# Patient Record
Sex: FEMALE | Race: WHITE | Employment: FULL TIME | URBAN - NONMETROPOLITAN AREA
[De-identification: names, ages, dates, MRNs, and addresses within clinical notes are randomized per-mention and may not be internally consistent; named-entity substitution may affect disease eponyms.]

---

## 2017-10-14 ENCOUNTER — NURSE TRIAGE (OUTPATIENT)
Dept: ADMINISTRATIVE | Age: 27
End: 2017-10-14

## 2017-10-14 ENCOUNTER — HOSPITAL ENCOUNTER (EMERGENCY)
Age: 27
Discharge: HOME | End: 2017-10-14
Attending: EMERGENCY MEDICINE
Payer: MEDICARE

## 2017-10-14 VITALS
SYSTOLIC BLOOD PRESSURE: 152 MMHG | RESPIRATION RATE: 18 BRPM | TEMPERATURE: 98.3 F | HEART RATE: 98 BPM | OXYGEN SATURATION: 97 % | DIASTOLIC BLOOD PRESSURE: 95 MMHG

## 2017-10-14 DIAGNOSIS — I82.4Z2 ACUTE DEEP VEIN THROMBOSIS (DVT) OF DISTAL VEIN OF LEFT LOWER EXTREMITY (HCC): Primary | ICD-10-CM

## 2017-10-14 PROCEDURE — 99282 EMERGENCY DEPT VISIT SF MDM: CPT

## 2017-10-14 ASSESSMENT — ENCOUNTER SYMPTOMS
SHORTNESS OF BREATH: 0
COUGH: 0
RHINORRHEA: 0
NAUSEA: 0
VOMITING: 0
WHEEZING: 0
ABDOMINAL DISTENTION: 0
EYE ITCHING: 0
EYE DISCHARGE: 0
CHEST TIGHTNESS: 0
ABDOMINAL PAIN: 0
DIARRHEA: 0

## 2017-10-14 ASSESSMENT — PAIN DESCRIPTION - DIRECTION: RADIATING_TOWARDS: KNEES

## 2017-10-14 ASSESSMENT — PAIN DESCRIPTION - PROGRESSION: CLINICAL_PROGRESSION: NOT CHANGED

## 2017-10-14 ASSESSMENT — PAIN DESCRIPTION - LOCATION
LOCATION: LEG
LOCATION: LEG

## 2017-10-14 ASSESSMENT — PAIN SCALES - GENERAL
PAINLEVEL_OUTOF10: 6
PAINLEVEL_OUTOF10: 5

## 2017-10-14 ASSESSMENT — PAIN DESCRIPTION - ORIENTATION
ORIENTATION: LEFT
ORIENTATION: LEFT;RIGHT

## 2017-10-14 ASSESSMENT — PAIN DESCRIPTION - PAIN TYPE: TYPE: ACUTE PAIN

## 2017-10-14 ASSESSMENT — PAIN DESCRIPTION - ONSET: ONSET: ON-GOING

## 2017-10-14 ASSESSMENT — PAIN DESCRIPTION - FREQUENCY: FREQUENCY: CONTINUOUS

## 2017-10-15 NOTE — ED TRIAGE NOTES
Pt reports to ED due to bilateral calf pain, She states the pain was just in the left calf but since going to another facility the pain is in both calves. Pt states she has been to another facility and they confirmed she has a DVT. Pt states they gave her Eliquis. Pt is anxious and repeatedly stating she doesn't want to die. Current condition and POC discussed with pt and she has calmed down at this time.

## 2017-10-15 NOTE — TELEPHONE ENCOUNTER
Seen in [place] [address] for a blood clot in leg yesterday. She is concerned because they just released her. Discharged from the hospital with diagnosis of a blood clot, leg. On eliquis and says they did not tell her what to do. Encouraged to follow up with family dr on Monday. Watching for any changes in size, tenderness, redness in leg. Watching for any changes in breathing status, dizzyness. No triage, health information only.

## 2017-10-15 NOTE — ED PROVIDER NOTES
[facility]  eMERGENCY dEPARTMENT eNCOUnter          [address] [facility]       Chief Complaint   Patient presents with    Leg Pain     pos dvt dx tonight       Nurses Notes reviewed and I agree except as noted in the HPI. HISTORY OF PRESENT ILLNESS    Janis Gil is a 24 y.o. female who presents to the Emergency Department for the evaluation of left lower leg pain. Patient states that she went to [facility] today for the pain where they did an ultrasound of her lower leg and diagnosed her with a DVT. She states that she was then discharged with Eliquis of which she has taken 2 doses today. Patient states that she was not given enough information about her diagnosis there, so she came here for a second opinion. Patient rates her pain at a 6/10 in severity, and states that the pain is cramping, shooting, and tingling in quality. She states that the pain is continuous in duration, and that walking worsens the pain. She denies any recent long car rides, chest pain, shortness of breath, abdominal pain, or headache. She reports that she is on birth control and has a family history of DVT. She states that her family has a history of factor V Leiden, and that she is unsure if she does. Patient denies further complaints at initial encounter. REVIEW OF SYSTEMS     Review of Systems   Constitutional: Negative for activity change, appetite change, chills, fatigue and fever. HENT: Negative for congestion, ear discharge, hearing loss, nosebleeds and rhinorrhea. Eyes: Negative for discharge and itching. Respiratory: Negative for cough, chest tightness, shortness of breath and wheezing. Cardiovascular: Negative for chest pain and palpitations. Gastrointestinal: Negative for abdominal distention, abdominal pain, diarrhea, nausea and vomiting. Endocrine: Negative for cold intolerance. Musculoskeletal: Positive for myalgias (left calf pain).  Negative for arthralgias, neck pain and neck stiffness. Skin: Negative for rash and wound. Neurological: Negative for dizziness, syncope, weakness, light-headedness, numbness and headaches. Psychiatric/Behavioral: Negative for agitation and suicidal ideas. PAST MEDICAL HISTORY    has a past medical history of ADHD (attention deficit hyperactivity disorder); Asthma; and Depression. SURGICAL HISTORY      has a past surgical history that includes  section (2015). CURRENT MEDICATIONS       Discharge Medication List as of 10/14/2017 11:48 PM      CONTINUE these medications which have NOT CHANGED    Details   Drospirenone-Ethinyl Estradiol (CELI PO) Take by mouth      ibuprofen (ADVIL;MOTRIN) 800 MG tablet Take 1 tablet by mouth every 8 hours as needed for Pain, Disp-30 tablet, R-0      albuterol (PROVENTIL) (2.5 MG/3ML) 0.083% nebulizer solution Take 2.5 mg by nebulization every 6 hours as needed for Wheezing      ipratropium-albuterol (DUONEB) 0.5-2.5 (3) MG/3ML SOLN nebulizer solution Inhale 3 mLs into the lungs every 4 hours, Disp-360 mL, R-0      albuterol (PROVENTIL HFA;VENTOLIN HFA) 108 (90 BASE) MCG/ACT inhaler Inhale 2 puffs into the lungs every 4 hours as needed for Wheezing      dicyclomine (BENTYL) 10 MG capsule Take 1 capsule by mouth 4 times daily (before meals and nightly), Disp-30 capsule, R-0Print             ALLERGIES     is allergic to sulfa antibiotics. FAMILY HISTORY     indicated that her mother is alive. She indicated that her father is alive. She indicated that her brother is alive. She indicated that her paternal grandfather is alive. family history includes Asthma in her father and paternal grandfather; Depression in her father. SOCIAL HISTORY      reports that she has been smoking. She has been smoking about 1.00 pack per day. She has never used smokeless tobacco. She reports that she drinks alcohol. She reports that she does not use drugs.     PHYSICAL EXAM INITIAL VITALS:  oral temperature is 98.3 °F (36.8 °C). Her blood pressure is 152/95 (abnormal) and her pulse is 98. Her respiration is 18 and oxygen saturation is 97%. Physical Exam   Constitutional: She is oriented to person, place, and time. She appears well-developed and well-nourished. She is active and cooperative. She does not appear ill. HENT:   Head: Normocephalic and atraumatic. Right Ear: External ear normal.   Left Ear: External ear normal.   Eyes: Conjunctivae and EOM are normal. Right eye exhibits no discharge. Left eye exhibits no discharge. No scleral icterus. Neck: Normal range of motion. Neck supple. No JVD present. No tracheal deviation present. Cardiovascular: Normal rate and normal heart sounds. Exam reveals no gallop and no friction rub. No murmur heard. Pulmonary/Chest: Effort normal and breath sounds normal. No stridor. No respiratory distress. She has no decreased breath sounds. She has no wheezes. She has no rhonchi. She has no rales. Abdominal: Soft. Normal appearance. She exhibits no distension. There is no tenderness. There is no rebound and no guarding. Musculoskeletal: Normal range of motion. She exhibits no edema. Left ankle: Normal.        Left upper leg: Normal.        Left lower leg: She exhibits tenderness. She exhibits no deformity. Left foot: Normal.   Neurological: She is alert and oriented to person, place, and time. She is not disoriented. No cranial nerve deficit or sensory deficit. She exhibits normal muscle tone. She displays no seizure activity. Coordination normal. GCS eye subscore is 4. GCS verbal subscore is 5. GCS motor subscore is 6. Skin: Skin is warm and dry. No rash noted. She is not diaphoretic. Psychiatric: She has a normal mood and affect. Her speech is normal and behavior is normal. Judgment and thought content normal.   Nursing note and vitals reviewed.       DIFFERENTIAL DIAGNOSIS:   DVT    DIAGNOSTIC RESULTS     EKG: All EKG's are interpreted by the Emergency Department Physician who either signs or Co-signs this chart in the absence of a cardiologist.  Interpreted by me  None    RADIOLOGY: non-plain film images(s) such as CT, Ultrasound and MRI are read by the radiologist.  None    LABS:   Labs Reviewed - No data to display    EMERGENCY DEPARTMENT COURSE:   Vitals:    Vitals:    10/14/17 2311   BP: (!) 152/95   Pulse: 98   Resp: 18   Temp: 98.3 °F (36.8 °C)   TempSrc: Oral   SpO2: 97%       11:34 PM Patient was seen and evaluated in a timely fashion. History and physical exam consistent with gastrocnemius vein DVT. Patient was seen at [facility] today where ultrasounds were completed. She came for a second opinion. I explained the details of the diagnosis, prognosis, and probable outcome. She was already on anticoagulation. Patient was reassured. The chance for PE was low. Discharged and should follow-up with PCP within 1 week. CRITICAL CARE:   None    CONSULTS:  None    PROCEDURES:  None    FINAL IMPRESSION      1. Acute deep vein thrombosis (DVT) of distal vein of left lower extremity ([address]          DISPOSITION/PLAN   Patient was discharged in stable condition    PATIENT REFERRED TO:  Zainab Amaya    In 1 week        DISCHARGE MEDICATIONS:  Discharge Medication List as of 10/14/2017 11:48 PM          (Please note that portions of this note were completed with a voice recognition program.  Efforts were made to edit the dictations but occasionally words are mis-transcribed.)    Scribe:  Chago Marin, 10/14/17 6:46 AM Scribing for and in the presence of No att. providers found.     Signed by: Juana Pearson, 10/14/17 6:46 AM     Provider:  I personally performed the services described in the documentation, reviewed and edited the documentation which was dictated to the scribe in my presence, and it accurately records my words and actions.     No att. providers

## 2017-10-16 ENCOUNTER — NURSE TRIAGE (OUTPATIENT)
Dept: ADMINISTRATIVE | Age: 27
End: 2017-10-16

## 2019-04-04 ENCOUNTER — NURSE TRIAGE (OUTPATIENT)
Dept: ADMINISTRATIVE | Age: 29
End: 2019-04-04

## 2019-04-04 NOTE — TELEPHONE ENCOUNTER
Becky De has Jayde Givens has been tested but do not have results yet.  How easily can it be spread?  Can it be spread through sexual contact. Tyson Ortiz is located on her inner upper thigh.  Please advise. Discussed wound care, is on antibiotic, wound is draining, and wanting to engage sex. Waiting on test results, explained the importance of clean and dry, and this being contagious.

## 2021-08-22 ENCOUNTER — APPOINTMENT (OUTPATIENT)
Dept: GENERAL RADIOLOGY | Age: 31
End: 2021-08-22
Payer: MEDICARE

## 2021-08-22 ENCOUNTER — HOSPITAL ENCOUNTER (EMERGENCY)
Age: 31
Discharge: HOME | End: 2021-08-22
Attending: FAMILY MEDICINE
Payer: MEDICARE

## 2021-08-22 VITALS
BODY MASS INDEX: 41.65 KG/M2 | WEIGHT: 250 LBS | TEMPERATURE: 97.8 F | SYSTOLIC BLOOD PRESSURE: 111 MMHG | HEIGHT: 65 IN | RESPIRATION RATE: 20 BRPM | OXYGEN SATURATION: 98 % | DIASTOLIC BLOOD PRESSURE: 63 MMHG | HEART RATE: 65 BPM

## 2021-08-22 DIAGNOSIS — S93.601A RIGHT FOOT SPRAIN, INITIAL ENCOUNTER: Primary | ICD-10-CM

## 2021-08-22 PROCEDURE — 73630 X-RAY EXAM OF FOOT: CPT

## 2021-08-22 PROCEDURE — 99283 EMERGENCY DEPT VISIT LOW MDM: CPT

## 2021-08-22 RX ORDER — LITHIUM CARBONATE 300 MG
300 TABLET ORAL DAILY
COMMUNITY

## 2021-08-22 RX ORDER — CITALOPRAM 40 MG/1
40 TABLET ORAL DAILY
COMMUNITY

## 2021-08-22 RX ORDER — BUSPIRONE HYDROCHLORIDE 10 MG/1
7.5 TABLET ORAL 2 TIMES DAILY
COMMUNITY
End: 2021-10-28

## 2021-08-22 ASSESSMENT — PAIN DESCRIPTION - LOCATION: LOCATION: FOOT;TOE (COMMENT WHICH ONE)

## 2021-08-22 ASSESSMENT — PAIN DESCRIPTION - PAIN TYPE: TYPE: ACUTE PAIN

## 2021-08-22 ASSESSMENT — PAIN SCALES - GENERAL: PAINLEVEL_OUTOF10: 4

## 2021-08-22 ASSESSMENT — PAIN DESCRIPTION - ORIENTATION: ORIENTATION: RIGHT

## 2021-08-23 NOTE — ED NOTES
Discharge teaching and instructions for condition explained to patient. AVS reviewed. Went over prescriptions with patient. Patient voiced understanding regarding prescriptions, follow up appointments and care of self at home. Pt discharged to home in stable condition per self.        Christina Nance RN  08/22/21 8384

## 2021-08-23 NOTE — ED PROVIDER NOTES
[facility]  eMERGENCY dEPARTMENT eNCOUnter          CHIEF COMPLAINT       Chief Complaint   Patient presents with    Toe Pain       Nurses Notes reviewed and I agree except as noted in the HPI. HISTORY OF PRESENT ILLNESS    Elle Ramirez is a 36 y.o. female who presents with right lateral toe pain and right midfoot pain. Patient states earlier today was playing kickball with kids and kicked a pole injuring right foot. Pain moderate in intensity worse with walking. Denies alleviating measures. REVIEW OF SYSTEMS     Review of Systems   Constitutional: Positive for activity change. Negative for chills and fever. Musculoskeletal: Positive for arthralgias (right 4th and 5th toe and right midfoot). Negative for joint swelling. Skin: Negative for rash and wound. Psychiatric/Behavioral: Negative for agitation and behavioral problems. All other systems reviewed and are negative. PAST MEDICAL HISTORY    has a past medical history of ADHD (attention deficit hyperactivity disorder), Asthma, and Depression. SURGICAL HISTORY      has a past surgical history that includes  section (2015).     CURRENT MEDICATIONS       Discharge Medication List as of 2021  9:13 PM      CONTINUE these medications which have NOT CHANGED    Details   lithium 300 MG tablet Take 300 mg by mouth dailyHistorical Med      citalopram (CELEXA) 40 MG tablet Take 40 mg by mouth dailyHistorical Med      busPIRone (BUSPAR) 10 MG tablet Take 7.5 mg by mouth 2 times dailyHistorical Med      ibuprofen (ADVIL;MOTRIN) 800 MG tablet Take 1 tablet by mouth every 8 hours as needed for Pain, Disp-30 tablet, R-0      albuterol (PROVENTIL) (2.5 MG/3ML) 0.083% nebulizer solution Take 2.5 mg by nebulization every 6 hours as needed for Wheezing      ipratropium-albuterol (DUONEB) 0.5-2.5 (3) MG/3ML SOLN nebulizer solution Inhale 3 mLs into the lungs every 4 hours, Disp-360 mL, R-0      albuterol (PROVENTIL HFA;VENTOLIN HFA) 108 (90 BASE) MCG/ACT inhaler Inhale 2 puffs into the lungs every 4 hours as needed for Wheezing             ALLERGIES     is allergic to sulfa antibiotics. FAMILY HISTORY     She indicated that her mother is alive. She indicated that her father is alive. She indicated that her brother is alive. She indicated that her paternal grandfather is alive. family history includes Asthma in her father and paternal grandfather; Depression in her father. SOCIAL HISTORY      reports that she has been smoking. She has been smoking about 1.00 pack per day. She has never used smokeless tobacco. She reports current alcohol use. She reports that she does not use drugs. PHYSICAL EXAM     INITIAL VITALS:  height is 5' 5\" (1.651 m) and weight is 250 lb (113.4 kg). Her oral temperature is 97.8 °F (36.6 °C). Her blood pressure is 111/63 and her pulse is 65. Her respiration is 20 and oxygen saturation is 98%. Physical Exam  Vitals and nursing note reviewed. Constitutional:       General: She is in acute distress. Musculoskeletal:         General: Tenderness (right 4th and 5th toes and right midfoot) and signs of injury present. No swelling or deformity. Skin:     General: Skin is dry. Capillary Refill: Capillary refill takes less than 2 seconds. Findings: No bruising, erythema or rash. Neurological:      Mental Status: She is alert. Sensory: No sensory deficit. Psychiatric:         Mood and Affect: Mood normal.         Behavior: Behavior normal.         DIFFERENTIAL DIAGNOSIS:   Foot sprain,fracture toe nos,    DIAGNOSTIC RESULTS         RADIOLOGY: non-plain film images(s) such as CT, Ultrasound and MRI are read by the radiologist.      XR FOOT RIGHT (MIN 3 VIEWS) (Final result)  Result time 08/22/21 21:07:58  Final result by Yaritza Padron MD (08/22/21 21:07:58)                Impression:    1. No acute findings.      This document has been electronically signed by: Yaritza Padron MD on   08/22/2021 09:07 PM            Narrative:    4 view right foot     Comparison: DX - FOOT RT MINIMUM 3 VIEWS - 02/01/2017 02:23 PM EST     Findings:   No fractures or dislocations. No significant loss of joint space, osteophytes, or erosions. No ankle effusion. No radiopaque foreign body. LABS:   Labs Reviewed - No data to display    EMERGENCY DEPARTMENT COURSE:   Vitals:    Vitals:    08/22/21 2026   BP: 111/63   Pulse: 65   Resp: 20   Temp: 97.8 °F (36.6 °C)   TempSrc: Oral   SpO2: 98%   Weight: 250 lb (113.4 kg)   Height: 5' 5\" (1.651 m)     On exam the patient's right foot she notes pain to the fourth and fifth phalanx. She also notes right midfoot pain. The right foot is of normal contour and shape. X-rays of the right foot do not show any acute fracture or dislocation. I did provide the patient a postop shoe. I will provide her analgesia for pain. Rest ice and elevation was advised. Activity as tolerated. Care instructions were provided. PROCEDURES:  None    FINAL IMPRESSION      1. Right foot sprain, initial encounter          DISPOSITION/PLAN   Home. Care instructions provided. Follow-up with PCP or ED as needed. PATIENT REFERRED TO:  No follow-up provider specified.     DISCHARGE MEDICATIONS:  Discharge Medication List as of 8/22/2021  9:13 PM          (Please note that portions of this note were completed with a voice recognition program.  Efforts were made to edit the dictations but occasionally words are mis-transcribed.)    MD Stacia Larson MD  08/22/21 8178

## 2021-08-23 NOTE — ED NOTES
C/o right foot last two digits pain into top of foot when she was playing kickball with her kids around 1600. She states she missed the ball and kicked a pole. No visible ecchymosis or swelling at this time. Triaged exam room 6. Pt states she went to [place] first but due to wait times came here instead. Ice pack given for non pharm pain relief measures.       Francisca Alves RN  08/22/21 2031

## 2021-10-28 ENCOUNTER — HOSPITAL ENCOUNTER (OUTPATIENT)
Age: 31
Setting detail: SPECIMEN
Discharge: HOME | End: 2021-10-28
Payer: MEDICARE

## 2021-10-28 ENCOUNTER — OFFICE VISIT (OUTPATIENT)
Dept: OBGYN CLINIC | Age: 31
End: 2021-10-28
Payer: MEDICARE

## 2021-10-28 VITALS
BODY MASS INDEX: 40.08 KG/M2 | SYSTOLIC BLOOD PRESSURE: 120 MMHG | DIASTOLIC BLOOD PRESSURE: 82 MMHG | WEIGHT: 240.6 LBS | HEIGHT: 65 IN

## 2021-10-28 DIAGNOSIS — Z11.3 SCREENING EXAMINATION FOR VENEREAL DISEASE: ICD-10-CM

## 2021-10-28 DIAGNOSIS — Z01.411 ABNORMAL FEMALE PELVIC EXAM: Primary | ICD-10-CM

## 2021-10-28 DIAGNOSIS — A60.04 HERPES SIMPLEX VULVOVAGINITIS: ICD-10-CM

## 2021-10-28 DIAGNOSIS — Z86.19 HISTORY OF HERPES GENITALIS: ICD-10-CM

## 2021-10-28 DIAGNOSIS — N89.8 VAGINAL ODOR: ICD-10-CM

## 2021-10-28 LAB
DIRECT EXAM: ABNORMAL
Lab: ABNORMAL
SPECIMEN DESCRIPTION: ABNORMAL

## 2021-10-28 PROCEDURE — G8484 FLU IMMUNIZE NO ADMIN: HCPCS | Performed by: ADVANCED PRACTICE MIDWIFE

## 2021-10-28 PROCEDURE — 99395 PREV VISIT EST AGE 18-39: CPT | Performed by: ADVANCED PRACTICE MIDWIFE

## 2021-10-28 RX ORDER — VALACYCLOVIR HYDROCHLORIDE 1 G/1
TABLET, FILM COATED ORAL
Qty: 10 TABLET | Refills: 2 | Status: SHIPPED | OUTPATIENT
Start: 2021-10-28

## 2021-10-28 RX ORDER — BUSPIRONE HYDROCHLORIDE 7.5 MG/1
TABLET ORAL
COMMUNITY
Start: 2021-10-16

## 2021-10-28 ASSESSMENT — ENCOUNTER SYMPTOMS
DIARRHEA: 0
SHORTNESS OF BREATH: 0
RESPIRATORY NEGATIVE: 1
CONSTIPATION: 0
ABDOMINAL PAIN: 0
GASTROINTESTINAL NEGATIVE: 1

## 2021-10-28 NOTE — PROGRESS NOTES
YEARLY PHYSICAL    Date of service: 10/28/2021    Rahul Torres  Is a 36 y.o.  but was  for period of time -  female    PT's PCP is: [facility]     : 1990                                             Subjective:       Patient's last menstrual period was 10/01/2021 (approximate).      Are your menses regular: yes    OB History    Para Term  AB Living   4 3 3   1 3   SAB TAB Ectopic Molar Multiple Live Births   1         3      # Outcome Date GA Lbr Maycol/2nd Weight Sex Delivery Anes PTL Lv   4 Term 09/22/15 39w0d  8 lb 1 oz (3.657 kg) F CS-LTranv Spinal  [place]   3 SAB 2014           2 Term 14 39w0d  8 lb 3 oz (3.714 kg) F CS-LTranv Spinal  [place]   1 Term 04/09/10 40w0d  8 lb 13 oz (3.997 kg) F CS-LTranv EPI  [place]        Social History     Tobacco Use   Smoking Status Former Smoker    Packs/day: 1.00   Smokeless Tobacco Never Used   Tobacco Comment    quit in 2021        Social History     Substance and Sexual Activity   Alcohol Use Not Currently       Family History   Problem Relation Age of Onset    Asthma Father     Depression Father     Asthma Paternal Grandfather     Prostate Cancer Paternal Grandfather        Any family history of breast or ovarian cancer: No    Any family history of blood clots: No      Allergies: Sulfa antibiotics      Current Outpatient Medications:     valACYclovir (VALTREX) 1 g tablet, Take 500mg orally twice daily for 5 days, Disp: 10 tablet, Rfl: 2    busPIRone (BUSPAR) 7.5 MG tablet, take 1 tablet by mouth twice a day, Disp: , Rfl:     lithium 300 MG tablet, Take 300 mg by mouth daily, Disp: , Rfl:     citalopram (CELEXA) 40 MG tablet, Take 40 mg by mouth daily, Disp: , Rfl:     ibuprofen (ADVIL;MOTRIN) 800 MG tablet, Take 1 tablet by mouth every 8 hours as needed for Pain, Disp: 30 tablet, Rfl: 0    albuterol (PROVENTIL) (2.5 MG/3ML) 0.083% nebulizer solution, Take 2.5 mg by nebulization every 6 hours as needed for Wheezing, Disp: , Rfl:     ipratropium-albuterol (DUONEB) 0.5-2.5 (3) MG/3ML SOLN nebulizer solution, Inhale 3 mLs into the lungs every 4 hours, Disp: 360 mL, Rfl: 0    albuterol (PROVENTIL HFA;VENTOLIN HFA) 108 (90 BASE) MCG/ACT inhaler, Inhale 2 puffs into the lungs every 4 hours as needed for Wheezing, Disp: , Rfl:     Social History     Substance and Sexual Activity   Sexual Activity Yes    Partners: Male       Any bleeding or pain with intercourse: No    Last Yearly:      Last pap:  - normal    Do you do self breast exams: Encouraged    Past Medical History:   Diagnosis Date    ADHD (attention deficit hyperactivity disorder)     Anxiety     Asthma     Bipolar 1 disorder (HCC)     Chlamydia     Depression     Herpes simplex virus (HSV) infection     genitial herpes    Thrombophlebitis        Past Surgical History:   Procedure Laterality Date     SECTION  9/22/2015    x 3    TUBAL LIGATION         Family History   Problem Relation Age of Onset    Asthma Father     Depression Father     Asthma Paternal Levonne Jessica Prostate Cancer Paternal Grandfather        Chief Complaint   Patient presents with    Annual Exam     Annual exam. Pap NL 20. Pt would like refill of Valtrex.  Other     Pt would like STD testing. Pt had unprotected intercourse recently.  Vaginal Odor     Pt c/o vaginal odor and would like checked for infection. Labs:    No results found for this visit on 10/28/21. HPI: Denies breast concerns. Menses regular. Denies pelvic pain. Reports that she and spouse were  for period of time - both had other partners and she had unprotected sex \"with someone sketchy\" so would like STI screening. Reports vaginal odor, denies pain/itch/burn. Does have possible HSV lesion - would like episodic treatment instead of suppressive at this time.      Review of Systems   Constitutional: Negative. Negative for chills, fatigue and fever. HENT: Negative. Respiratory: Negative. Negative for shortness of breath. Cardiovascular: Negative. Negative for chest pain. Gastrointestinal: Negative. Negative for abdominal pain, constipation and diarrhea. Genitourinary: Positive for genital sores (Questionable HSV lesion on right side). Negative for dysuria, enuresis, frequency, menstrual problem, pelvic pain, urgency, vaginal bleeding and vaginal discharge (vaginal odor). Musculoskeletal: Negative. Neurological: Negative. Negative for dizziness, light-headedness and headaches. Psychiatric/Behavioral: Negative. Objective  Blood pressure 120/82, height 5' 5\" (1.651 m), weight 240 lb 9.6 oz (109.1 kg), last menstrual period 10/01/2021, currently breastfeeding. Physical Exam  Constitutional:       Appearance: Normal appearance. She is obese. Genitourinary:      Pelvic exam was performed with patient in the lithotomy position. Urethra, vagina, cervix and uterus normal.      Vulval lesion present. Uterus is anteverted and regular. Right and left adnexa are non-palpable. Genitourinary Comments: Adnexa not palpable due to body habitus   HENT:      Head: Normocephalic. Eyes:      Pupils: Pupils are equal, round, and reactive to light. Cardiovascular:      Rate and Rhythm: Normal rate and regular rhythm. Pulses: Normal pulses. Heart sounds: Normal heart sounds. No murmur heard. Pulmonary:      Effort: Pulmonary effort is normal.      Breath sounds: Normal breath sounds. No wheezing. Chest:      Breasts:         Right: Normal.         Left: Normal.   Abdominal:      Palpations: Abdomen is soft. Tenderness: There is no abdominal tenderness. Musculoskeletal:         General: Normal range of motion. Cervical back: Normal range of motion. No muscular tenderness.    Neurological:      Mental Status: She is alert and oriented to person, place, and time. Skin:     General: Skin is warm and dry. Psychiatric:         Behavior: Behavior normal.   Vitals and nursing note reviewed. Chaperone present: Declined. Assessment and Plan          Diagnosis Orders   1. Abnormal female pelvic exam     2. Vaginal odor  VAGINITIS DNA PROBE    C.trachomatis N.gonorrhoeae DNA   3. Screening examination for venereal disease  VAGINITIS DNA PROBE    C.trachomatis N.gonorrhoeae DNA   4. History of herpes genitalis     5. Herpes simplex vulvovaginitis       Reviewed STI screening - gc/ct and affirm with consent - aware we will call if abn    Discussed HSV with current lesion - will treat with valtrex. If having more than 4 outbreaks a year can have suppression therapy. Repeat Annual every 1 year  Cervical Cytology Evaluation begins at 24years old. If Negative Cytology, Follow-up screening per current guidelines. Mammograms every 1year. If 35 yo and last mammogram was negative. Calcium and Vitamin D dosing reviewed. Colonoscopy screening reviewed as well as onset for bone density testing. Birth control and barrier recommendationsdiscussed. STD counseling and prevention reviewed. Gardisil counseling completed for all patients. Routine healthmaintenance per patients PCP. I am having Sandro Worley start on valACYclovir. I am also having her maintain her albuterol sulfate HFA, albuterol, ipratropium-albuterol, ibuprofen, lithium, citalopram, and busPIRone. Return in about 1 year (around 10/28/2022) for Yearly. She was also counseled on her preventative health maintenance recommendations and follow-up. There are no Patient Instructions on file for this visit.     Anca Maldonado, APRN - CNM,10/28/2021 9:02 AM

## 2021-10-29 LAB
C TRACH DNA GENITAL QL NAA+PROBE: NEGATIVE
N. GONORRHOEAE DNA: NEGATIVE
SPECIMEN DESCRIPTION: NORMAL

## 2021-11-01 RX ORDER — METRONIDAZOLE 500 MG/1
500 TABLET ORAL 2 TIMES DAILY
Qty: 14 TABLET | Refills: 0 | Status: SHIPPED | OUTPATIENT
Start: 2021-11-01 | End: 2021-11-08

## 2022-06-29 RX ORDER — VALACYCLOVIR HYDROCHLORIDE 500 MG/1
TABLET, FILM COATED ORAL
Qty: 10 TABLET | Refills: 2 | OUTPATIENT
Start: 2022-06-29

## 2022-11-30 ENCOUNTER — TELEPHONE (OUTPATIENT)
Dept: OBGYN CLINIC | Age: 32
End: 2022-11-30

## 2022-11-30 NOTE — TELEPHONE ENCOUNTER
Pt is scheduled to see BR 12-5 for STD test and herpes outbreak. She is out of preventive med and if possible would like a refill called in to  to at least get her to the appt on 12-5. Please advise patient.

## 2022-12-01 RX ORDER — VALACYCLOVIR HYDROCHLORIDE 1 G/1
1000 TABLET, FILM COATED ORAL DAILY
Qty: 5 TABLET | Refills: 0 | Status: SHIPPED | OUTPATIENT
Start: 2022-12-01 | End: 2022-12-06

## 2022-12-05 ENCOUNTER — HOSPITAL ENCOUNTER (OUTPATIENT)
Age: 32
Setting detail: SPECIMEN
Discharge: HOME | End: 2022-12-05

## 2022-12-05 ENCOUNTER — OFFICE VISIT (OUTPATIENT)
Dept: OBGYN CLINIC | Age: 32
End: 2022-12-05
Payer: MEDICARE

## 2022-12-05 VITALS
BODY MASS INDEX: 32.22 KG/M2 | SYSTOLIC BLOOD PRESSURE: 110 MMHG | WEIGHT: 193.4 LBS | HEIGHT: 65 IN | DIASTOLIC BLOOD PRESSURE: 60 MMHG

## 2022-12-05 DIAGNOSIS — A60.04 HERPES SIMPLEX VULVOVAGINITIS: Primary | ICD-10-CM

## 2022-12-05 DIAGNOSIS — N92.0 MENORRHAGIA WITH REGULAR CYCLE: ICD-10-CM

## 2022-12-05 DIAGNOSIS — Z11.3 SCREEN FOR STD (SEXUALLY TRANSMITTED DISEASE): ICD-10-CM

## 2022-12-05 PROCEDURE — G8484 FLU IMMUNIZE NO ADMIN: HCPCS | Performed by: ADVANCED PRACTICE MIDWIFE

## 2022-12-05 PROCEDURE — 99214 OFFICE O/P EST MOD 30 MIN: CPT | Performed by: ADVANCED PRACTICE MIDWIFE

## 2022-12-05 PROCEDURE — 1036F TOBACCO NON-USER: CPT | Performed by: ADVANCED PRACTICE MIDWIFE

## 2022-12-05 PROCEDURE — G8417 CALC BMI ABV UP PARAM F/U: HCPCS | Performed by: ADVANCED PRACTICE MIDWIFE

## 2022-12-05 PROCEDURE — G8427 DOCREV CUR MEDS BY ELIG CLIN: HCPCS | Performed by: ADVANCED PRACTICE MIDWIFE

## 2022-12-05 RX ORDER — VALACYCLOVIR HYDROCHLORIDE 500 MG/1
500 TABLET, FILM COATED ORAL DAILY
Qty: 30 TABLET | Refills: 12 | Status: SHIPPED | OUTPATIENT
Start: 2022-12-05

## 2022-12-06 DIAGNOSIS — R76.8 HEPATITIS C ANTIBODY TEST POSITIVE: Primary | ICD-10-CM

## 2022-12-06 LAB — HEPATITIS C ANTIBODY: REACTIVE

## 2022-12-06 ASSESSMENT — ENCOUNTER SYMPTOMS
GASTROINTESTINAL NEGATIVE: 1
RESPIRATORY NEGATIVE: 1

## 2022-12-06 NOTE — PROGRESS NOTES
PROBLEM VISIT     Date of service: 2022    Josue Taylor  Is a 29 y.o.  female    PT's PCP is: [facility]     : 1990                                          HPI Here for eval.  Reports that cycles had been very regular but more recently they have changed - last 4 months - heavier (changes tampon hourly), bleeds for 5-6 days, clots present, no pain. Also having increased HSV lesions - reports used to have 1-2/year but now having them monthly after cycle - would like to start daily suppression therapy  Increased vaginal discharge \"off white\" in color, denies vaginal itch/burn/odor. Desires full STI screening -  left her 7 months ago and has now had 5 partners since their separation. Is currently in a relationship more stable with this. Review of Systems   Constitutional: Negative. HENT: Negative. Respiratory: Negative. Gastrointestinal: Negative. Genitourinary:  Positive for menstrual problem and vaginal discharge. Negative for pelvic pain and vaginal pain. Neurological: Negative. Psychiatric/Behavioral: Negative. Patient's last menstrual period was 2022 (approximate).    OB History    Para Term  AB Living   4 3 3   1 3   SAB IAB Ectopic Molar Multiple Live Births   1         3      # Outcome Date GA Lbr Maycol/2nd Weight Sex Delivery Anes PTL Lv   4 Term 09/22/15 39w0d  8 lb 1 oz (3.657 kg) F CS-LTranv Spinal  [place]   3 SAB 2014           2 Term 14 39w0d  8 lb 3 oz (3.714 kg) F CS-LTranv Spinal  [place]   1 Term 04/09/10 40w0d  8 lb 13 oz (3.997 kg) F CS-LTranv EPI  [place]        Social History     Tobacco Use   Smoking Status Former    Packs/day: 1.00    Types: Cigarettes   Smokeless Tobacco Never   Tobacco Comments    quit in 2021        Social History     Substance and Sexual Activity   Alcohol Use Not Currently       Allergies: Sulfa antibiotics      Current Outpatient Medications:     valACYclovir (VALTREX) 500 MG tablet, Take 1 tablet by mouth daily, Disp: 30 tablet, Rfl: 12    ibuprofen (ADVIL;MOTRIN) 800 MG tablet, Take 1 tablet by mouth every 8 hours as needed for Pain, Disp: 30 tablet, Rfl: 0    albuterol (PROVENTIL) (2.5 MG/3ML) 0.083% nebulizer solution, Take 2.5 mg by nebulization every 6 hours as needed for Wheezing, Disp: , Rfl:     ipratropium-albuterol (DUONEB) 0.5-2.5 (3) MG/3ML SOLN nebulizer solution, Inhale 3 mLs into the lungs every 4 hours, Disp: 360 mL, Rfl: 0    albuterol (PROVENTIL HFA;VENTOLIN HFA) 108 (90 BASE) MCG/ACT inhaler, Inhale 2 puffs into the lungs every 4 hours as needed for Wheezing, Disp: , Rfl:     busPIRone (BUSPAR) 7.5 MG tablet, take 1 tablet by mouth twice a day (Patient not taking: Reported on 12/5/2022), Disp: , Rfl:     lithium 300 MG tablet, Take 300 mg by mouth daily (Patient not taking: Reported on 12/5/2022), Disp: , Rfl:     citalopram (CELEXA) 40 MG tablet, Take 40 mg by mouth daily (Patient not taking: Reported on 12/5/2022), Disp: , Rfl:     Social History     Substance and Sexual Activity   Sexual Activity Yes    Partners: Male       Chief Complaint   Patient presents with    Other     Pt is having a herpes break out with every cycle every month. Vaginal Discharge     Pt c/o large amount of discharge for past 4 month. Discharge is off white yellowish color. Pt  of 11 years cheated on her and pt has had several new partners in the last 6 months. Physical Exam  Constitutional:       Appearance: Normal appearance. She is obese. Genitourinary:      Vaginal discharge and erythema present. No cervical motion tenderness. HENT:      Head: Normocephalic. Eyes:      Pupils: Pupils are equal, round, and reactive to light. Pulmonary:      Effort: Pulmonary effort is normal.   Musculoskeletal:         General: Normal range of motion. Cervical back: Normal range of motion.    Neurological:      Mental Status: She is alert and oriented to person, place, and time. Skin:     General: Skin is warm and dry. Psychiatric:         Behavior: Behavior normal.   Vitals and nursing note reviewed. Vital Signs  Blood pressure 110/60, height 5' 5\" (1.651 m), weight 193 lb 6.4 oz (87.7 kg), last menstrual period 11/16/2022, currently breastfeeding. Assessment and Plan          Diagnosis Orders   1. Herpes simplex vulvovaginitis        2. Screen for STD (sexually transmitted disease)  valACYclovir (VALTREX) 500 MG tablet    HIV Screen    Hepatitis C Antibody    T. pallidum Ab      3. Menorrhagia with regular cycle            Discussed cycle changes - has had multiple stressors which can change cycle - relationship changes, new partners (need to rule out infection) or other unknown cause. Discussed if negative labs/cultures then consider pelvic Usn day 7-9 - she is agreeable    Discussed HSV recurrent outbreaks - discussed suppression with daily valtrex but if outbreaks above the 500mg dose - will rx 1000mg po daily - encouraged healthy diet/lifestyle    Discussed STI screening - nxgen obtained with consent and will have serum labs - aware we will call if abn results. I have discontinued Lollyron Worley's valACYclovir. I am also having her start on valACYclovir. Additionally, I am having her maintain her albuterol sulfate HFA, albuterol, ipratropium-albuterol, ibuprofen, lithium, citalopram, and busPIRone. No follow-ups on file. She was also counseled on her preventative health maintenance recommendations and follow-up. There are no Patient Instructions on file for this visit.     KASI Turner CNM,12/6/2022 1:37 PM                     Electronically signed by KASI Turner CNM

## 2022-12-08 ENCOUNTER — TELEPHONE (OUTPATIENT)
Dept: OBGYN CLINIC | Age: 32
End: 2022-12-08

## 2022-12-08 LAB
HIV AG/AB: NONREACTIVE
T. PALLIDUM, IGG: NONREACTIVE

## 2022-12-08 RX ORDER — FLUCONAZOLE 150 MG/1
150 TABLET ORAL ONCE
Qty: 1 TABLET | Refills: 1 | Status: SHIPPED | OUTPATIENT
Start: 2022-12-08 | End: 2022-12-08

## 2022-12-08 RX ORDER — METRONIDAZOLE 7.5 MG/G
1 GEL VAGINAL DAILY
Qty: 1 EACH | Refills: 0 | Status: SHIPPED | OUTPATIENT
Start: 2022-12-08 | End: 2022-12-13

## 2022-12-08 RX ORDER — DOXYCYCLINE HYCLATE 100 MG
100 TABLET ORAL 2 TIMES DAILY
Qty: 28 TABLET | Refills: 0 | Status: SHIPPED | OUTPATIENT
Start: 2022-12-08 | End: 2022-12-22

## 2022-12-08 NOTE — TELEPHONE ENCOUNTER
Please call patient with nxgen results:    She was positive for multiple bacteria that should be treated. I will send script for metrogel which will treat 3/5 bacteria present - no sex during and for 48 hours after treatment    The ureplasma we will treat with doxycycline x 2 weeks - will send diflucan in case develops yeast    The enterococcus in vaginal canal  - should clear with other treatments, encourage no thongs and avoid wiping back to front.       Had normal vaginal peggy levels of good bacteria

## 2022-12-19 ENCOUNTER — OFFICE VISIT (OUTPATIENT)
Dept: OBGYN CLINIC | Age: 32
End: 2022-12-19
Payer: MEDICARE

## 2022-12-19 ENCOUNTER — TELEPHONE (OUTPATIENT)
Dept: OBGYN CLINIC | Age: 32
End: 2022-12-19

## 2022-12-19 VITALS
SYSTOLIC BLOOD PRESSURE: 90 MMHG | DIASTOLIC BLOOD PRESSURE: 54 MMHG | WEIGHT: 189.8 LBS | BODY MASS INDEX: 31.62 KG/M2 | HEIGHT: 65 IN

## 2022-12-19 DIAGNOSIS — N90.89 HEMATOMA OF LABIA MAJORA: Primary | ICD-10-CM

## 2022-12-19 PROCEDURE — 1036F TOBACCO NON-USER: CPT | Performed by: ADVANCED PRACTICE MIDWIFE

## 2022-12-19 PROCEDURE — G8417 CALC BMI ABV UP PARAM F/U: HCPCS | Performed by: ADVANCED PRACTICE MIDWIFE

## 2022-12-19 PROCEDURE — G8484 FLU IMMUNIZE NO ADMIN: HCPCS | Performed by: ADVANCED PRACTICE MIDWIFE

## 2022-12-19 PROCEDURE — 99213 OFFICE O/P EST LOW 20 MIN: CPT | Performed by: ADVANCED PRACTICE MIDWIFE

## 2022-12-19 PROCEDURE — G8427 DOCREV CUR MEDS BY ELIG CLIN: HCPCS | Performed by: ADVANCED PRACTICE MIDWIFE

## 2022-12-19 RX ORDER — FLUCONAZOLE 150 MG/1
TABLET ORAL
COMMUNITY
Start: 2022-12-08

## 2022-12-19 RX ORDER — MELATONIN
COMMUNITY
Start: 2022-12-09

## 2022-12-19 RX ORDER — TIOTROPIUM BROMIDE INHALATION SPRAY 1.56 UG/1
SPRAY, METERED RESPIRATORY (INHALATION)
COMMUNITY
Start: 2022-12-09

## 2022-12-19 RX ORDER — TOPIRAMATE 25 MG/1
TABLET ORAL
COMMUNITY
Start: 2022-12-13

## 2022-12-19 RX ORDER — VARENICLINE TARTRATE 1 MG/1
TABLET, FILM COATED ORAL
COMMUNITY
Start: 2022-12-07

## 2022-12-19 RX ORDER — PHENTERMINE HYDROCHLORIDE 37.5 MG/1
TABLET ORAL
COMMUNITY
Start: 2022-12-13

## 2022-12-19 ASSESSMENT — ENCOUNTER SYMPTOMS
GASTROINTESTINAL NEGATIVE: 1
RESPIRATORY NEGATIVE: 1

## 2022-12-19 NOTE — PROGRESS NOTES
PROBLEM VISIT     Date of service: 2022    Baldwin Klinefelter  Is a 29 y.o.  female    PT's PCP is: [facility]     : 1990                                          HPI Here for evaluation of right labial pain. Reports onset last evening while having intercourse and now a slight bulge on the right side. Area is painful and swollen. Did use icepack last evening with minimal relief. Denies any other changes. Review of Systems   Constitutional: Negative. HENT: Negative. Respiratory: Negative. Gastrointestinal: Negative. Genitourinary:  Positive for vaginal pain (right labial pain). Neurological: Negative. Psychiatric/Behavioral: Negative. Patient's last menstrual period was 2022 (exact date).    OB History    Para Term  AB Living   4 3 3   1 3   SAB IAB Ectopic Molar Multiple Live Births   1         3      # Outcome Date GA Lbr Maycol/2nd Weight Sex Delivery Anes PTL Lv   4 Term 09/22/15 39w0d  8 lb 1 oz (3.657 kg) F CS-LTranv Spinal  [place]   3 SAB 2014           2 Term 14 39w0d  8 lb 3 oz (3.714 kg) F CS-LTranv Spinal  [place]   1 Term 04/09/10 40w0d  8 lb 13 oz (3.997 kg) F CS-LTranv EPI  [place]        Social History     Tobacco Use   Smoking Status Former    Packs/day: 1.00    Types: Cigarettes   Smokeless Tobacco Never   Tobacco Comments    quit in 2021        Social History     Substance and Sexual Activity   Alcohol Use Not Currently       Allergies: Sulfa antibiotics      Current Outpatient Medications:     vitamin D3 (CHOLECALCIFEROL) 25 MCG (1000 UT) TABS tablet, take 1 tablet by mouth once daily, Disp: , Rfl:     fluconazole (DIFLUCAN) 150 MG tablet, take 1 tablet by mouth AS A ONE TIME DOSE, Disp: , Rfl:     phentermine (ADIPEX-P) 37.5 MG tablet, take 1 tablet by mouth once daily for 15 DAYS, Disp: , Rfl:     SPIRIVA RESPIMAT 1.25 MCG/ACT AERS inhaler, inhale 2 puffs by mouth and INTO THE LUNGS once daily, Disp: , Rfl: topiramate (TOPAMAX) 25 MG tablet, take 1 tablet by mouth once daily for 15 DAYS, Disp: , Rfl:     varenicline (CHANTIX) 1 MG tablet, take 1 tablet by mouth twice a day, Disp: , Rfl:     doxycycline hyclate (VIBRA-TABS) 100 MG tablet, Take 1 tablet by mouth 2 times daily for 14 days, Disp: 28 tablet, Rfl: 0    valACYclovir (VALTREX) 500 MG tablet, Take 1 tablet by mouth daily, Disp: 30 tablet, Rfl: 12    ibuprofen (ADVIL;MOTRIN) 800 MG tablet, Take 1 tablet by mouth every 8 hours as needed for Pain, Disp: 30 tablet, Rfl: 0    albuterol (PROVENTIL) (2.5 MG/3ML) 0.083% nebulizer solution, Take 2.5 mg by nebulization every 6 hours as needed for Wheezing, Disp: , Rfl:     ipratropium-albuterol (DUONEB) 0.5-2.5 (3) MG/3ML SOLN nebulizer solution, Inhale 3 mLs into the lungs every 4 hours, Disp: 360 mL, Rfl: 0    albuterol (PROVENTIL HFA;VENTOLIN HFA) 108 (90 BASE) MCG/ACT inhaler, Inhale 2 puffs into the lungs every 4 hours as needed for Wheezing, Disp: , Rfl:     busPIRone (BUSPAR) 7.5 MG tablet, take 1 tablet by mouth twice a day (Patient not taking: No sig reported), Disp: , Rfl:     lithium 300 MG tablet, Take 300 mg by mouth daily (Patient not taking: Reported on 12/5/2022), Disp: , Rfl:     citalopram (CELEXA) 40 MG tablet, Take 40 mg by mouth daily (Patient not taking: No sig reported), Disp: , Rfl:     Social History     Substance and Sexual Activity   Sexual Activity Yes    Partners: Male       Chief Complaint   Patient presents with    Pelvic Pain     Pt was having intercourse last night and fells a bugle on right vaginal lip, also states it is swollen. Physical Exam  Constitutional:       Appearance: Normal appearance. She is obese. Genitourinary:      Right Labia: tenderness (right labial hematoma - bruising evident, palpable central area, compressible, no open areas). HENT:      Head: Normocephalic. Eyes:      Pupils: Pupils are equal, round, and reactive to light.    Pulmonary:      Effort: Pulmonary effort is normal.   Musculoskeletal:         General: Normal range of motion. Cervical back: Normal range of motion. Neurological:      Mental Status: She is alert and oriented to person, place, and time. Skin:     General: Skin is warm and dry. Psychiatric:         Behavior: Behavior normal.   Vitals and nursing note reviewed. Vital Signs  Blood pressure (!) 90/54, height 5' 5\" (1.651 m), weight 189 lb 12.8 oz (86.1 kg), last menstrual period 12/18/2022, currently breastfeeding. Assessment and Plan          Diagnosis Orders   1. Hematoma of labia majora            Encouraged rest of vagina, ice to area, monitor - call if worsening or changes. Should resolve on own. Can recheck in 1 week if she would like      I am having [place]. Angel Luisjacinto maintain her albuterol sulfate HFA, albuterol, ipratropium-albuterol, ibuprofen, lithium, citalopram, busPIRone, valACYclovir, doxycycline hyclate, vitamin D3, fluconazole, phentermine, Spiriva Respimat, topiramate, and varenicline. Return Needs a day 7-9 pelvic USN and f/u, for Pelvic USN, Gyn f/u. She was also counseled on her preventative health maintenance recommendations and follow-up. There are no Patient Instructions on file for this visit.     KASI Kohler CNM,12/19/2022 2:29 PM                     Electronically signed by KASI Kohler CNM

## 2022-12-19 NOTE — TELEPHONE ENCOUNTER
Patient called stating that she thinks she needs an appointment. She was having intercourse last night and felt a sharp pain. When she went to the restroom afterwards, she felt a little lump towards the opening of the vagina. She states that the area is very painful. She will come in today for evaluation. Patient verbalized understanding, no further questions/concerns voiced.

## 2022-12-22 DIAGNOSIS — R76.8 HEPATITIS C ANTIBODY TEST POSITIVE: ICD-10-CM

## 2023-07-17 ENCOUNTER — HOSPITAL ENCOUNTER (OUTPATIENT)
Age: 33
Setting detail: SPECIMEN
Discharge: HOME | End: 2023-07-17

## 2023-07-17 ENCOUNTER — OFFICE VISIT (OUTPATIENT)
Dept: OBGYN CLINIC | Age: 33
End: 2023-07-17
Payer: COMMERCIAL

## 2023-07-17 VITALS
SYSTOLIC BLOOD PRESSURE: 110 MMHG | WEIGHT: 192 LBS | BODY MASS INDEX: 31.99 KG/M2 | DIASTOLIC BLOOD PRESSURE: 80 MMHG | HEIGHT: 65 IN

## 2023-07-17 DIAGNOSIS — N76.1 SUBACUTE VAGINITIS: ICD-10-CM

## 2023-07-17 DIAGNOSIS — R30.0 BURNING WITH URINATION: Primary | ICD-10-CM

## 2023-07-17 DIAGNOSIS — R23.4 FISSURE IN SKIN: ICD-10-CM

## 2023-07-17 DIAGNOSIS — R30.0 BURNING WITH URINATION: ICD-10-CM

## 2023-07-17 LAB
APPEARANCE FLUID: ABNORMAL
BILIRUBIN, POC: ABNORMAL
BLOOD URINE, POC: ABNORMAL
CLARITY, POC: ABNORMAL
COLOR, POC: ABNORMAL
GLUCOSE URINE, POC: ABNORMAL
KETONES, POC: ABNORMAL
LEUKOCYTE EST, POC: ABNORMAL
NITRITE, POC: ABNORMAL
PH, POC: 5.5
PROTEIN, POC: ABNORMAL
SPECIFIC GRAVITY, POC: 1.03
UROBILINOGEN, POC: 0.2

## 2023-07-17 PROCEDURE — 81002 URINALYSIS NONAUTO W/O SCOPE: CPT | Performed by: ADVANCED PRACTICE MIDWIFE

## 2023-07-17 PROCEDURE — 99214 OFFICE O/P EST MOD 30 MIN: CPT | Performed by: ADVANCED PRACTICE MIDWIFE

## 2023-07-17 RX ORDER — FLUCONAZOLE 150 MG/1
150 TABLET ORAL ONCE
Qty: 1 TABLET | Refills: 0 | Status: SHIPPED | OUTPATIENT
Start: 2023-07-17 | End: 2023-07-17

## 2023-07-17 RX ORDER — METRONIDAZOLE 500 MG/1
500 TABLET ORAL 2 TIMES DAILY
Qty: 14 TABLET | Refills: 0 | Status: SHIPPED | OUTPATIENT
Start: 2023-07-17 | End: 2023-07-24

## 2023-07-17 ASSESSMENT — ENCOUNTER SYMPTOMS
RESPIRATORY NEGATIVE: 1
NAUSEA: 1
BACK PAIN: 1

## 2023-07-17 NOTE — PROGRESS NOTES
PROBLEM VISIT     Date of service: 2023    Valery Foy  Is a 34 y.o.  female    PT's PCP is: [facility]     : 1990                                          HPI Here for eval of vaginitis s/s. Reports that is now , has had same BF x 1 year and things had been good. However approx 2 weeks ago had intercourse in [address] (dead fish and algae in plenty near her) and since has had vaginal burning. Occ bleeding present vaginally. Dysuria but \"its on the outside\". Has continued to have almost daily intercourse. Has not used anything OTC. Has hx of recurrent vaginal infections. Desires gc/ct with additional affirm     Review of Systems   Constitutional: Negative. HENT: Negative. Respiratory: Negative. Gastrointestinal:  Positive for nausea. Genitourinary:  Positive for dysuria, vaginal bleeding and vaginal pain. Negative for menstrual problem, pelvic pain and vaginal discharge. Musculoskeletal:  Positive for back pain. Neurological: Negative. Psychiatric/Behavioral: Negative. Patient's last menstrual period was 2023 (exact date).    OB History    Para Term  AB Living   4 3 3   1 3   SAB IAB Ectopic Molar Multiple Live Births   1         3      # Outcome Date GA Lbr Maycol/2nd Weight Sex Delivery Anes PTL Lv   4 Term 09/22/15 39w0d  8 lb 1 oz (3.657 kg) F CS-LTranv Spinal  [place]   3 SAB 2014           2 Term 14 39w0d  8 lb 3 oz (3.714 kg) F CS-LTranv Spinal  [place]   1 Term 04/09/10 40w0d  8 lb 13 oz (3.997 kg) F CS-LTranv EPI  [place]        Social History     Tobacco Use   Smoking Status Former    Packs/day: 1.00    Types: Cigarettes   Smokeless Tobacco Never   Tobacco Comments    quit in 2021        Social History     Substance and Sexual Activity   Alcohol Use Not Currently       Allergies: Sulfa antibiotics      Current Outpatient Medications:     metroNIDAZOLE (FLAGYL) 500 MG tablet, Take 1 tablet by mouth 2 times daily for

## 2023-07-18 LAB
C TRACH DNA SPEC QL PROBE+SIG AMP: NEGATIVE
CANDIDA SPECIES: POSITIVE
GARDNERELLA VAGINALIS: NEGATIVE
MICROORGANISM SPEC CULT: ABNORMAL
N GONORRHOEA DNA SPEC QL PROBE+SIG AMP: NEGATIVE
SOURCE: ABNORMAL
SPECIMEN DESCRIPTION: ABNORMAL
SPECIMEN DESCRIPTION: NORMAL
TRICHOMONAS: NEGATIVE

## 2023-07-19 RX ORDER — AMOXICILLIN 500 MG/1
500 CAPSULE ORAL 2 TIMES DAILY
Qty: 14 CAPSULE | Refills: 0 | Status: SHIPPED | OUTPATIENT
Start: 2023-07-19 | End: 2023-07-26

## 2023-11-14 ENCOUNTER — OFFICE VISIT (OUTPATIENT)
Dept: OBGYN CLINIC | Age: 33
End: 2023-11-14
Payer: COMMERCIAL

## 2023-11-14 VITALS
WEIGHT: 196.4 LBS | SYSTOLIC BLOOD PRESSURE: 100 MMHG | HEIGHT: 65 IN | BODY MASS INDEX: 32.72 KG/M2 | DIASTOLIC BLOOD PRESSURE: 70 MMHG

## 2023-11-14 DIAGNOSIS — N90.7 EPIDERMOID CYST OF VULVA: Primary | ICD-10-CM

## 2023-11-14 PROCEDURE — 56405 I&D VULVA/PERINEAL ABSCESS: CPT | Performed by: ADVANCED PRACTICE MIDWIFE

## 2023-11-14 SDOH — ECONOMIC STABILITY: FOOD INSECURITY: WITHIN THE PAST 12 MONTHS, THE FOOD YOU BOUGHT JUST DIDN'T LAST AND YOU DIDN'T HAVE MONEY TO GET MORE.: SOMETIMES TRUE

## 2023-11-14 SDOH — ECONOMIC STABILITY: TRANSPORTATION INSECURITY
IN THE PAST 12 MONTHS, HAS LACK OF TRANSPORTATION KEPT YOU FROM MEETINGS, WORK, OR FROM GETTING THINGS NEEDED FOR DAILY LIVING?: NO

## 2023-11-14 SDOH — ECONOMIC STABILITY: FOOD INSECURITY: WITHIN THE PAST 12 MONTHS, YOU WORRIED THAT YOUR FOOD WOULD RUN OUT BEFORE YOU GOT MONEY TO BUY MORE.: PATIENT DECLINED

## 2023-11-14 SDOH — ECONOMIC STABILITY: HOUSING INSECURITY
IN THE LAST 12 MONTHS, WAS THERE A TIME WHEN YOU DID NOT HAVE A STEADY PLACE TO SLEEP OR SLEPT IN A SHELTER (INCLUDING NOW)?: NO

## 2023-11-14 SDOH — ECONOMIC STABILITY: INCOME INSECURITY: HOW HARD IS IT FOR YOU TO PAY FOR THE VERY BASICS LIKE FOOD, HOUSING, MEDICAL CARE, AND HEATING?: NOT HARD AT ALL

## 2023-11-14 ASSESSMENT — PATIENT HEALTH QUESTIONNAIRE - PHQ9
SUM OF ALL RESPONSES TO PHQ QUESTIONS 1-9: 0
SUM OF ALL RESPONSES TO PHQ QUESTIONS 1-9: 0
1. LITTLE INTEREST OR PLEASURE IN DOING THINGS: 0
SUM OF ALL RESPONSES TO PHQ QUESTIONS 1-9: 0
SUM OF ALL RESPONSES TO PHQ9 QUESTIONS 1 & 2: 0
2. FEELING DOWN, DEPRESSED OR HOPELESS: NOT AT ALL
2. FEELING DOWN, DEPRESSED OR HOPELESS: 0
SUM OF ALL RESPONSES TO PHQ9 QUESTIONS 1 & 2: 0
1. LITTLE INTEREST OR PLEASURE IN DOING THINGS: NOT AT ALL
SUM OF ALL RESPONSES TO PHQ QUESTIONS 1-9: 0

## 2023-11-14 ASSESSMENT — ENCOUNTER SYMPTOMS
RESPIRATORY NEGATIVE: 1
GASTROINTESTINAL NEGATIVE: 1

## 2023-11-14 NOTE — PROGRESS NOTES
PROBLEM VISIT     Date of service: 2023    Montserrat Magallon  Is a 34 y.o.  female    PT's PCP is: [id number]      : 1990                                          HPI Here for eval of vaginal lesions. Onset x 4-6 months \"have squeezed them just don't go away\". Denies pain, itching, has not spread, no changes in size, 2 areas of clusters on bilateral labia. Review of Systems   Constitutional: Negative. HENT: Negative. Respiratory: Negative. Gastrointestinal: Negative. Genitourinary:         Lesions to bilateral labia - \"lumps\"   Neurological: Negative. Psychiatric/Behavioral: Negative. Patient's last menstrual period was 10/31/2023 (approximate).    OB History    Para Term  AB Living   4 3 3   1 3   SAB IAB Ectopic Molar Multiple Live Births   1         3      # Outcome Date GA Lbr Maycol/2nd Weight Sex Delivery Anes PTL Lv   4 Term 09/22/15 39w0d  3.657 kg (8 lb 1 oz) F CS-LTranv Spinal  [place]   3 SAB 2014           2 Term 14 39w0d  3.714 kg (8 lb 3 oz) F CS-LTranv Spinal  [place]   1 Term 04/09/10 40w0d  3.997 kg (8 lb 13 oz) F CS-LTranv EPI  [place]        Social History     Tobacco Use   Smoking Status Every Day    Packs/day: 0.50    Years: 10.00    Additional pack years: 0.00    Total pack years: 5.00    Types: Cigarettes   Smokeless Tobacco Never   Tobacco Comments    quit in 2021        Social History     Substance and Sexual Activity   Alcohol Use Not Currently       Allergies: Sulfa antibiotics      Current Outpatient Medications:     vitamin D3 (CHOLECALCIFEROL) 25 MCG (1000 UT) TABS tablet, take 1 tablet by mouth once daily, Disp: , Rfl:     phentermine (ADIPEX-P) 37.5 MG tablet, take 1 tablet by mouth once daily for 15 DAYS, Disp: , Rfl:     topiramate (TOPAMAX) 25 MG tablet, take 1 tablet by mouth once daily for 15 DAYS, Disp: , Rfl:     valACYclovir (VALTREX) 500 MG tablet, Take 1 tablet by mouth daily, Disp: 30 tablet, Rfl: 12

## 2024-02-07 ENCOUNTER — HOSPITAL ENCOUNTER (OUTPATIENT)
Age: 34
Setting detail: SPECIMEN
Discharge: HOME | End: 2024-02-07

## 2024-02-07 ENCOUNTER — OFFICE VISIT (OUTPATIENT)
Dept: OBGYN CLINIC | Age: 34
End: 2024-02-07
Payer: COMMERCIAL

## 2024-02-07 VITALS
SYSTOLIC BLOOD PRESSURE: 112 MMHG | WEIGHT: 200.2 LBS | HEIGHT: 65 IN | BODY MASS INDEX: 33.36 KG/M2 | DIASTOLIC BLOOD PRESSURE: 66 MMHG

## 2024-02-07 DIAGNOSIS — N76.1 SUBACUTE VAGINITIS: Primary | ICD-10-CM

## 2024-02-07 DIAGNOSIS — N76.1 SUBACUTE VAGINITIS: ICD-10-CM

## 2024-02-07 PROCEDURE — 99214 OFFICE O/P EST MOD 30 MIN: CPT | Performed by: NURSE PRACTITIONER

## 2024-02-07 RX ORDER — METRONIDAZOLE 500 MG/1
500 TABLET ORAL 2 TIMES DAILY
Qty: 14 TABLET | Refills: 0 | Status: SHIPPED | OUTPATIENT
Start: 2024-02-07 | End: 2024-02-14

## 2024-02-07 RX ORDER — LITHIUM CARBONATE 150 MG/1
150 CAPSULE ORAL 2 TIMES DAILY
COMMUNITY
Start: 2023-12-19

## 2024-02-07 RX ORDER — BUSPIRONE HYDROCHLORIDE 7.5 MG/1
7.5 TABLET ORAL NIGHTLY
COMMUNITY
Start: 2023-12-19

## 2024-02-07 RX ORDER — CITALOPRAM 20 MG/1
20 TABLET ORAL DAILY
COMMUNITY
Start: 2023-12-19

## 2024-02-07 ASSESSMENT — ENCOUNTER SYMPTOMS: RESPIRATORY NEGATIVE: 1

## 2024-02-07 NOTE — PROGRESS NOTES
PROBLEM VISIT     Date of service: 2024    Miriam Worley  Is a 33 y.o. female    PT's PCP is: [facility]     : 1990                                             Subjective:       Patient's last menstrual period was 2024 (approximate).   OB History    Para Term  AB Living   4 3 3   1 3   SAB IAB Ectopic Molar Multiple Live Births   1         3      # Outcome Date GA Lbr Maycol/2nd Weight Sex Delivery Anes PTL Lv   4 Term 09/22/15 39w0d  3.657 kg (8 lb 1 oz) F CS-LTranv Spinal  [place]   3 SAB 2014           2 Term 14 39w0d  3.714 kg (8 lb 3 oz) F CS-LTranv Spinal  [place]   1 Term 04/09/10 40w0d  3.997 kg (8 lb 13 oz) F CS-LTranv EPI  [place]        Social History     Tobacco Use   Smoking Status Every Day    Current packs/day: 0.50    Average packs/day: 0.5 packs/day for 10.0 years (5.0 ttl pk-yrs)    Types: Cigarettes   Smokeless Tobacco Never   Tobacco Comments    quit in 2021        Social History     Substance and Sexual Activity   Alcohol Use Not Currently       Allergies: Sulfa antibiotics      Current Outpatient Medications:     busPIRone (BUSPAR) 7.5 MG tablet, Take 1 tablet by mouth nightly at bedtime., Disp: , Rfl:     citalopram (CELEXA) 20 MG tablet, Take 1 tablet by mouth daily, Disp: , Rfl:     lithium 150 MG capsule, Take 1 capsule by mouth 2 times daily, Disp: , Rfl:     metroNIDAZOLE (FLAGYL) 500 MG tablet, Take 1 tablet by mouth 2 times daily for 7 days, Disp: 14 tablet, Rfl: 0    vitamin D3 (CHOLECALCIFEROL) 25 MCG (1000 UT) TABS tablet, take 1 tablet by mouth once daily, Disp: , Rfl:     phentermine (ADIPEX-P) 37.5 MG tablet, take 1 tablet by mouth once daily for 15 DAYS, Disp: , Rfl:     topiramate (TOPAMAX) 25 MG tablet, take 1 tablet by mouth once daily for 15 DAYS, Disp: , Rfl:     valACYclovir (VALTREX) 500 MG tablet, Take 1 tablet by mouth daily, Disp: 30 tablet, Rfl: 12    albuterol (PROVENTIL) (2.5 MG/3ML) 0.083% nebulizer solution, Take 3

## 2024-02-08 LAB
CANDIDA SPECIES: NEGATIVE
GARDNERELLA VAGINALIS: POSITIVE
SOURCE: ABNORMAL
TRICHOMONAS: NEGATIVE

## 2024-08-13 ENCOUNTER — HOSPITAL ENCOUNTER (OUTPATIENT)
Age: 34
Setting detail: SPECIMEN
Discharge: HOME | End: 2024-08-13

## 2024-08-13 ENCOUNTER — OFFICE VISIT (OUTPATIENT)
Dept: OBGYN CLINIC | Age: 34
End: 2024-08-13
Payer: COMMERCIAL

## 2024-08-13 VITALS
DIASTOLIC BLOOD PRESSURE: 70 MMHG | BODY MASS INDEX: 33.99 KG/M2 | SYSTOLIC BLOOD PRESSURE: 112 MMHG | WEIGHT: 204 LBS | HEIGHT: 65 IN

## 2024-08-13 DIAGNOSIS — N89.8 VAGINAL ITCHING: ICD-10-CM

## 2024-08-13 DIAGNOSIS — N76.1 SUBACUTE VAGINITIS: ICD-10-CM

## 2024-08-13 DIAGNOSIS — Z11.3 SCREENING FOR STDS (SEXUALLY TRANSMITTED DISEASES): ICD-10-CM

## 2024-08-13 DIAGNOSIS — N89.8 VAGINAL ITCHING: Primary | ICD-10-CM

## 2024-08-13 PROCEDURE — 99213 OFFICE O/P EST LOW 20 MIN: CPT | Performed by: ADVANCED PRACTICE MIDWIFE

## 2024-08-13 ASSESSMENT — ENCOUNTER SYMPTOMS
RESPIRATORY NEGATIVE: 1
GASTROINTESTINAL NEGATIVE: 1

## 2024-08-14 LAB
CANDIDA SPECIES: POSITIVE
GARDNERELLA VAGINALIS: POSITIVE
SOURCE: ABNORMAL
TRICHOMONAS: NEGATIVE

## 2024-08-15 RX ORDER — METRONIDAZOLE 500 MG/1
500 TABLET ORAL 2 TIMES DAILY
Qty: 14 TABLET | Refills: 0 | Status: SHIPPED | OUTPATIENT
Start: 2024-08-15 | End: 2024-08-22

## 2024-08-15 RX ORDER — FLUCONAZOLE 150 MG/1
150 TABLET ORAL ONCE
Qty: 1 TABLET | Refills: 0 | Status: SHIPPED | OUTPATIENT
Start: 2024-08-15 | End: 2024-08-15

## 2024-08-16 LAB
C TRACH DNA SPEC QL PROBE+SIG AMP: NEGATIVE
N GONORRHOEA DNA SPEC QL PROBE+SIG AMP: NEGATIVE
SPECIMEN DESCRIPTION: NORMAL

## 2025-03-18 DIAGNOSIS — Z11.3 SCREEN FOR STD (SEXUALLY TRANSMITTED DISEASE): ICD-10-CM

## 2025-03-18 RX ORDER — VALACYCLOVIR HYDROCHLORIDE 500 MG/1
500 TABLET, FILM COATED ORAL DAILY
Qty: 30 TABLET | Refills: 0 | Status: SHIPPED | OUTPATIENT
Start: 2025-03-18

## 2025-03-18 NOTE — TELEPHONE ENCOUNTER
Patient called requesting a refill of Valtrex.  I did explain that she is due for an annual and call transferred up front to schedule.  Ok to send in refill to [facility]?